# Patient Record
Sex: MALE | Race: OTHER | HISPANIC OR LATINO
[De-identification: names, ages, dates, MRNs, and addresses within clinical notes are randomized per-mention and may not be internally consistent; named-entity substitution may affect disease eponyms.]

---

## 2017-02-20 ENCOUNTER — TRANSCRIPTION ENCOUNTER (OUTPATIENT)
Age: 32
End: 2017-02-20

## 2017-05-19 ENCOUNTER — EMERGENCY (EMERGENCY)
Facility: HOSPITAL | Age: 32
LOS: 1 days | Discharge: HOME | End: 2017-05-19
Attending: EMERGENCY MEDICINE | Admitting: EMERGENCY MEDICINE
Payer: MEDICAID

## 2017-05-19 VITALS
TEMPERATURE: 99 F | HEART RATE: 67 BPM | RESPIRATION RATE: 16 BRPM | SYSTOLIC BLOOD PRESSURE: 108 MMHG | OXYGEN SATURATION: 99 % | DIASTOLIC BLOOD PRESSURE: 69 MMHG

## 2017-05-19 DIAGNOSIS — T18.9XXA FOREIGN BODY OF ALIMENTARY TRACT, PART UNSPECIFIED, INITIAL ENCOUNTER: ICD-10-CM

## 2017-05-19 DIAGNOSIS — Z79.899 OTHER LONG TERM (CURRENT) DRUG THERAPY: ICD-10-CM

## 2017-05-19 DIAGNOSIS — F84.0 AUTISTIC DISORDER: ICD-10-CM

## 2017-05-19 PROCEDURE — 74000: CPT

## 2017-05-19 PROCEDURE — 74000: CPT | Mod: 26

## 2017-05-19 PROCEDURE — 99284 EMERGENCY DEPT VISIT MOD MDM: CPT

## 2017-05-19 PROCEDURE — 71045 X-RAY EXAM CHEST 1 VIEW: CPT

## 2017-05-19 PROCEDURE — 71010: CPT | Mod: 26

## 2017-05-19 RX ORDER — TOPIRAMATE 25 MG
0 TABLET ORAL
Qty: 0 | Refills: 0 | COMMUNITY

## 2017-05-19 RX ORDER — OLANZAPINE 15 MG/1
0 TABLET, FILM COATED ORAL
Qty: 0 | Refills: 0 | COMMUNITY

## 2017-05-19 RX ORDER — CALCIUM CARBONATE 500(1250)
0 TABLET ORAL
Qty: 0 | Refills: 0 | COMMUNITY

## 2017-05-19 RX ORDER — LAMOTRIGINE 25 MG/1
0 TABLET, ORALLY DISINTEGRATING ORAL
Qty: 0 | Refills: 0 | COMMUNITY

## 2017-05-19 NOTE — ED PROVIDER NOTE - PHYSICAL EXAMINATION
Gen: NAD  Head: NCAT  Lung: CTAB, no respiratory distress, no wheezing, rales, rhonchi  CV: normal s1/s2, rrr, no murmurs, Normal perfusion, pulses 2+ throughout  Abd: soft, NTND  MSK: No edema, no visible deformities, full range of motion in all 4 extremities  Skin: No rash

## 2017-05-19 NOTE — ED PROVIDER NOTE - MEDICAL DECISION MAKING DETAILS
32yo male PMh seizure disorder, autism presenting with possible foreign body ingestion, patient asymptomatic at this time. Will obtain xray abdomen and chest xray to evaluate, likely DC home. Kaila Roberts DO

## 2017-05-19 NOTE — ED PROVIDER NOTE - ATTENDING CONTRIBUTION TO CARE
30yo male PMh seizure disorder, autism presenting with suspicion of foreign body ingestion, asymptomatic, abd soft, nt, tolerating po. plain films ordered. no one witnessed him eating a plastic piece of his lunch box but it wasn't found.

## 2017-05-19 NOTE — ED ADULT NURSE NOTE - OBJECTIVE STATEMENT
pt ambulatory to [facility] accompanied by aide/ pt sent from [facility]. [pt with h/o autism lives in a group home  asper aise pt ate a piece of plastic from inside his lunch box. pt with h/o [place] syndrome. pt denies annette c/o abd soft nt pt requesting "cookies"

## 2018-10-22 ENCOUNTER — EMERGENCY (EMERGENCY)
Facility: HOSPITAL | Age: 33
LOS: 1 days | Discharge: HOME | End: 2018-10-22
Attending: EMERGENCY MEDICINE | Admitting: EMERGENCY MEDICINE
Payer: MEDICAID

## 2018-10-22 VITALS
HEART RATE: 68 BPM | TEMPERATURE: 98 F | DIASTOLIC BLOOD PRESSURE: 61 MMHG | RESPIRATION RATE: 18 BRPM | SYSTOLIC BLOOD PRESSURE: 113 MMHG | OXYGEN SATURATION: 100 %

## 2018-10-22 PROBLEM — G40.909 EPILEPSY, UNSPECIFIED, NOT INTRACTABLE, WITHOUT STATUS EPILEPTICUS: Chronic | Status: ACTIVE | Noted: 2017-05-19

## 2018-10-22 PROBLEM — F84.0 AUTISTIC DISORDER: Chronic | Status: ACTIVE | Noted: 2017-05-19

## 2018-10-22 PROCEDURE — 99283 EMERGENCY DEPT VISIT LOW MDM: CPT

## 2018-10-22 PROCEDURE — 71046 X-RAY EXAM CHEST 2 VIEWS: CPT | Mod: 26

## 2018-10-22 PROCEDURE — 74019 RADEX ABDOMEN 2 VIEWS: CPT | Mod: 26

## 2018-10-22 NOTE — ED PROVIDER NOTE - MEDICAL DECISION MAKING DETAILS
33M with autism and pica c/o cotton fabric ingestion 3 hrs ago. no vomiting or nausea, no shortness of breath or visible pain, witnessed by aid , will get xray concerning for other ingestion, will d/c and follow-up if neg imaging.

## 2018-10-22 NOTE — ED PROVIDER NOTE - PLAN OF CARE
Thank you for visiting our Emergency Department, it has been a pleasure taking part in your healthcare.    Your discharge diagnosis is: Foreign body ingestion    A copy of resulted labs, imaging, and findings have been provided to you.   You have had a detailed discussion with your provider regarding your diagnosis, management and discharge plan including, but not limited to: return precautions, follow up visits with existing or new providers, new prescriptions and/or medication changes, wound and/or spint/cast care or other care   aspects specific to your diagnosis and treatment. You have been given the opportunity to have your questions answered. At this time you have been deemed stable and fit for discharge.    Return precautions to the Emergency Department include but are not limited to: unrelenting nausea, vomiting, fever, chills, chest pain, shortness of breath, dizziness, chest or abdominal pain, worsening back pain, syncope, blood in urine or stool, headache that doesn't resolve, numbness or tingling, loss of sensation, loss of motor function, or any other concerning symptoms.

## 2018-10-22 NOTE — ED PROVIDER NOTE - OBJECTIVE STATEMENT
33M pmh intellectual dis, pica, c/o foreign ingestion of about 5x6cm pieces of cotton fabric around 3 hrs ago. Pt denied any abd pain and ate some peanuts afterward with out difficult or vomiting. Ingestion is witnessed by the aid and denied any other ingestion. normal bowel movement

## 2018-10-22 NOTE — ED PROVIDER NOTE - CARE PLAN
Principal Discharge DX:	Foreign body ingestion, initial encounter  Assessment and plan of treatment:	Thank you for visiting our Emergency Department, it has been a pleasure taking part in your healthcare.    Your discharge diagnosis is: Foreign body ingestion    A copy of resulted labs, imaging, and findings have been provided to you.   You have had a detailed discussion with your provider regarding your diagnosis, management and discharge plan including, but not limited to: return precautions, follow up visits with existing or new providers, new prescriptions and/or medication changes, wound and/or spint/cast care or other care   aspects specific to your diagnosis and treatment. You have been given the opportunity to have your questions answered. At this time you have been deemed stable and fit for discharge.    Return precautions to the Emergency Department include but are not limited to: unrelenting nausea, vomiting, fever, chills, chest pain, shortness of breath, dizziness, chest or abdominal pain, worsening back pain, syncope, blood in urine or stool, headache that doesn't resolve, numbness or tingling, loss of sensation, loss of motor function, or any other concerning symptoms.

## 2018-10-22 NOTE — ED ADULT TRIAGE NOTE - CHIEF COMPLAINT QUOTE
pt sent to ED from group home for eating a large piece of fabric.  pt appears in NAD.  as per aide, pt is at mental baseline, calm and cooperative at this time

## 2018-10-22 NOTE — ED PROVIDER NOTE - ATTENDING CONTRIBUTION TO CARE
Patient is a 34 yo M with history of autism, seizure disorder here for evaluation after eating a piece of a t-shirt at 10 AM. No nausea, no vomiting.     VS noted  Gen. no acute distress, Non toxic   HEENT: EOMI, mmm,   Lungs: CTAB/L no C/ W /R   CVS: RRR   Abd; Soft non tender, non distended   Ext: no edema  Skin: no rash  Neuro AAOx3 non focal clear speech  a/p:   - Kelsey ESCOBEDO Patient is a 32 yo M with history of autism, seizure disorder here for evaluation after eating a piece of a t-shirt at 10 AM. No nausea, no vomiting.     VS noted  Gen. no acute distress, Non toxic   HEENT: EOMI, mmm, PERRL  Lungs: CTAB/L no C/ W /R   CVS: RRR   Abd; Soft non tender, non distended   Ext: no edema  Skin: no rash  Neuro AAOx3 non focal clear speech  a/p: s/p ingestion of piece of cotton. No other ingestions per HHA. Pt has been eating and drinking since then. No shortness of breath, no abdominal pain. Explained expectant management.   - Kelsey ESCOBEDO Patient is a 34 yo M with history of autism, seizure disorder here for evaluation after eating a piece of a t-shirt at 10 AM. No nausea, no vomiting.     VS noted  Gen. no acute distress, Non toxic   HEENT: EOMI, mmm, PERRL  Lungs: CTAB/L no C/ W /R   CVS: RRR   Abd; Soft non tender, non distended   Ext: no edema  Skin: no rash  Neuro AAOx3 non focal clear speech  a/p: s/p ingestion of piece of cotton. No other ingestions per HHA. Pt has been eating and drinking since then. No shortness of breath, no abdominal pain. Explained expectant management. Patient stable for discharge.   - Kelsey ESCOBEDO

## 2018-10-29 ENCOUNTER — EMERGENCY (EMERGENCY)
Facility: HOSPITAL | Age: 33
LOS: 1 days | Discharge: HOME | End: 2018-10-29
Attending: EMERGENCY MEDICINE | Admitting: EMERGENCY MEDICINE
Payer: MEDICAID

## 2018-10-29 VITALS
TEMPERATURE: 98 F | RESPIRATION RATE: 17 BRPM | OXYGEN SATURATION: 100 % | DIASTOLIC BLOOD PRESSURE: 62 MMHG | HEART RATE: 70 BPM | SYSTOLIC BLOOD PRESSURE: 121 MMHG

## 2018-10-29 PROCEDURE — 74018 RADEX ABDOMEN 1 VIEW: CPT | Mod: 26

## 2018-10-29 PROCEDURE — 99283 EMERGENCY DEPT VISIT LOW MDM: CPT

## 2018-10-29 PROCEDURE — 71045 X-RAY EXAM CHEST 1 VIEW: CPT | Mod: 26

## 2018-10-29 NOTE — ED ADULT TRIAGE NOTE - CHIEF COMPLAINT QUOTE
pt arriving from [facility], brought in by aide for possible ingestions of button from shirt. pt is nonverbal. pt arriving from [facility], brought in by aide for possible ingestions of button from shirt. pt is nonverbal. pt calm in triage, no guarding noted.

## 2018-10-29 NOTE — ED PROVIDER NOTE - MEDICAL DECISION MAKING DETAILS
33y M with foreign body ingestion this morning. No airway compromise or abd findings concerning obstruction of airway or GI tract. Aid with pt and did not witness event so will get XR to evaluate. If nml and pt tolerates PO will dc home

## 2018-10-29 NOTE — ED PROVIDER NOTE - OBJECTIVE STATEMENT
33y M PMHx nonverbal from [facility] here for foreign ingestion today. Pt brought in to ED by aid after swallowed shirt button. Has hx of foreign ingestion. Acting baseline since 33y M PMHx nonverbal from [facility] here for foreign ingestion today. Pt brought in to ED by aide after swallowed shirt button. Has hx of foreign ingestion, seen last week for same. Acting baseline since as per aide.  Pt without airway compromise, N/V/D or obvious abdominal pain.  Pt is hungry as per aide.

## 2018-10-30 ENCOUNTER — EMERGENCY (EMERGENCY)
Facility: HOSPITAL | Age: 33
LOS: 1 days | Discharge: HOME | End: 2018-10-30
Attending: EMERGENCY MEDICINE | Admitting: EMERGENCY MEDICINE
Payer: MEDICAID

## 2018-10-30 VITALS
HEART RATE: 75 BPM | TEMPERATURE: 98 F | RESPIRATION RATE: 18 BRPM | SYSTOLIC BLOOD PRESSURE: 105 MMHG | OXYGEN SATURATION: 98 % | DIASTOLIC BLOOD PRESSURE: 69 MMHG

## 2018-10-30 VITALS
RESPIRATION RATE: 16 BRPM | HEART RATE: 63 BPM | SYSTOLIC BLOOD PRESSURE: 120 MMHG | TEMPERATURE: 96 F | OXYGEN SATURATION: 100 % | DIASTOLIC BLOOD PRESSURE: 58 MMHG

## 2018-10-30 PROCEDURE — 99283 EMERGENCY DEPT VISIT LOW MDM: CPT

## 2018-10-30 PROCEDURE — 71045 X-RAY EXAM CHEST 1 VIEW: CPT | Mod: 26

## 2018-10-30 PROCEDURE — 74018 RADEX ABDOMEN 1 VIEW: CPT | Mod: 26

## 2018-10-30 NOTE — ED ADULT NURSE NOTE - NSIMPLEMENTINTERV_GEN_ALL_ED
Implemented All Universal Safety Interventions:  [place] to call system. Call bell, personal items and telephone within reach. Instruct patient to call for assistance. Room bathroom lighting operational. Non-slip footwear when patient is off stretcher. Physically safe environment: no spills, clutter or unnecessary equipment. Stretcher in lowest position, wheels locked, appropriate side rails in place.

## 2018-10-30 NOTE — ED PROVIDER NOTE - OBJECTIVE STATEMENT
China Suarez MD PGY-1 Pt is a 33 t/o M hx autism d/o (nonverbal at baseline) and seizure disorder, presents from school after ingestion of paper clothes tag at 930 AM. Presnts with  who states that this is a recurrent episode, last happened yesterday. Did not exhibit abnormal behavior after ate the tag, but states that he has a history of Pica. No SOB, chest or abdominal pain. No difficulty breathing.

## 2018-10-30 NOTE — ED PROVIDER NOTE - MEDICAL DECISION MAKING DETAILS
China Suarez MD PGY-1 Pt is a 33 t/o M hx autism d/o (nonverbal at baseline) and seizure disorder, presents from school after ingestion of paper clothes tag at 930 AM. No respiratory distress. Xray chest/abdomen to r/o foreign body obstruction

## 2018-10-30 NOTE — ED PROVIDER NOTE - ATTENDING CONTRIBUTION TO CARE
ben: lamonte from facility because he allegedly swallowed a small cloth tag (from a jacket) today.  no new sx.   seen yesterday for button ingestion.  exam: NAD. abd soft and non-tender.   will obtain cxr and abd flat plate.

## 2018-10-30 NOTE — ED ADULT NURSE NOTE - CHIEF COMPLAINT QUOTE
p/t with hx MR  sent from day care for eval, s/p "close tag" ingestion this am, no signs of any airway issues noted

## 2018-10-30 NOTE — ED PROVIDER NOTE - CHIEF COMPLAINT
The patient is a 33y Male complaining of The patient is a 33y Male complaining of foreign body ingestion

## 2018-10-30 NOTE — ED ADULT NURSE NOTE - OBJECTIVE STATEMENT
Received pt into [facility], accompanied by school staff. Pt is A/O x 1, nonverbal , communicates via sign language. School staff able to communicate with pt via sign language. Pt does not appear in any acute distress. Pt smiling, communicating with school staff member via sign language to ask for food. Instructed staff pt to be NPO until eval by MD. No resp distress, no coughing, drooling or clearing of throat noted while in room. Vitals stable. Pt calm cooperative. Awaiting eval by MD

## 2020-12-23 NOTE — ED PROVIDER NOTE - NS ED MD DISPO DISCHARGE CCDA
Notify the patient PSA for prostate cancer normal blood sugar kidney liver test electrolytes cholesterol thyroid all normal Patient/Caregiver provided printed discharge information.

## 2023-09-25 ENCOUNTER — APPOINTMENT (OUTPATIENT)
Dept: OTOLARYNGOLOGY | Facility: CLINIC | Age: 38
End: 2023-09-25
Payer: MEDICAID

## 2023-09-25 ENCOUNTER — OUTPATIENT (OUTPATIENT)
Dept: OUTPATIENT SERVICES | Facility: HOSPITAL | Age: 38
LOS: 1 days | Discharge: HOME | End: 2023-09-25

## 2023-09-25 VITALS
WEIGHT: 190 LBS | HEART RATE: 59 BPM | SYSTOLIC BLOOD PRESSURE: 102 MMHG | BODY MASS INDEX: 29.82 KG/M2 | DIASTOLIC BLOOD PRESSURE: 69 MMHG | HEIGHT: 67 IN

## 2023-09-25 DIAGNOSIS — H93.293 OTHER ABNORMAL AUDITORY PERCEPTIONS, BILATERAL: ICD-10-CM

## 2023-09-25 DIAGNOSIS — Z86.59 PERSONAL HISTORY OF OTHER MENTAL AND BEHAVIORAL DISORDERS: ICD-10-CM

## 2023-09-25 DIAGNOSIS — H61.23 IMPACTED CERUMEN, BILATERAL: ICD-10-CM

## 2023-09-25 PROCEDURE — 92567 TYMPANOMETRY: CPT

## 2023-09-25 PROCEDURE — 99203 OFFICE O/P NEW LOW 30 MIN: CPT | Mod: 25

## 2023-09-25 PROCEDURE — 92557 COMPREHENSIVE HEARING TEST: CPT

## 2023-09-25 PROCEDURE — G0268 REMOVAL OF IMPACTED WAX MD: CPT

## 2023-09-25 RX ORDER — LEVETIRACETAM 1000 MG/1
TABLET, FILM COATED ORAL
Refills: 0 | Status: ACTIVE | COMMUNITY

## 2023-09-25 RX ORDER — MIDAZOLAM 5 MG/.1ML
5 SPRAY NASAL
Refills: 0 | Status: ACTIVE | COMMUNITY

## 2023-09-25 RX ORDER — LURASIDONE HYDROCHLORIDE 60 MG/1
TABLET, FILM COATED ORAL
Refills: 0 | Status: ACTIVE | COMMUNITY

## 2023-09-25 RX ORDER — LAMOTRIGINE 150 MG/1
TABLET ORAL
Refills: 0 | Status: ACTIVE | COMMUNITY

## 2023-09-25 RX ORDER — TOPIRAMATE 50 MG/1
TABLET, FILM COATED ORAL
Refills: 0 | Status: ACTIVE | COMMUNITY

## 2023-09-25 RX ORDER — CHOLECALCIFEROL (VITAMIN D3) 25 MCG
TABLET ORAL
Refills: 0 | Status: ACTIVE | COMMUNITY

## 2023-09-27 DIAGNOSIS — H93.293 OTHER ABNORMAL AUDITORY PERCEPTIONS, BILATERAL: ICD-10-CM

## 2023-09-27 DIAGNOSIS — H61.23 IMPACTED CERUMEN, BILATERAL: ICD-10-CM

## 2024-09-09 ENCOUNTER — APPOINTMENT (OUTPATIENT)
Dept: OTOLARYNGOLOGY | Facility: CLINIC | Age: 39
End: 2024-09-09
Payer: MEDICAID

## 2024-09-09 ENCOUNTER — OUTPATIENT (OUTPATIENT)
Dept: OUTPATIENT SERVICES | Facility: HOSPITAL | Age: 39
LOS: 1 days | Discharge: HOME | End: 2024-09-09

## 2024-09-09 VITALS
WEIGHT: 201 LBS | HEIGHT: 66 IN | HEART RATE: 54 BPM | DIASTOLIC BLOOD PRESSURE: 65 MMHG | SYSTOLIC BLOOD PRESSURE: 110 MMHG | BODY MASS INDEX: 32.3 KG/M2

## 2024-09-09 DIAGNOSIS — H93.293 OTHER ABNORMAL AUDITORY PERCEPTIONS, BILATERAL: ICD-10-CM

## 2024-09-09 DIAGNOSIS — F81.89 OTHER DEVELOPMENTAL DISORDERS OF SCHOLASTIC SKILLS: ICD-10-CM

## 2024-09-09 DIAGNOSIS — H61.23 IMPACTED CERUMEN, BILATERAL: ICD-10-CM

## 2024-09-09 PROCEDURE — 69210 REMOVE IMPACTED EAR WAX UNI: CPT | Mod: 50

## 2024-09-09 RX ORDER — LURASIDONE HYDROCHLORIDE 80 MG/1
80 TABLET, FILM COATED ORAL
Qty: 30 | Refills: 0 | Status: ACTIVE | COMMUNITY
Start: 2024-06-20

## 2024-09-09 RX ORDER — IBUPROFEN 600 MG/1
600 TABLET, FILM COATED ORAL
Qty: 28 | Refills: 0 | Status: ACTIVE | COMMUNITY
Start: 2024-05-08

## 2024-09-09 RX ORDER — LAMOTRIGINE 250 MG/1
250 TABLET, EXTENDED RELEASE ORAL
Qty: 60 | Refills: 0 | Status: ACTIVE | COMMUNITY
Start: 2024-05-16

## 2024-09-09 RX ORDER — CALCIUM CARBONATE/VITAMIN D3 500MG-5MCG
500-5 TABLET ORAL
Qty: 60 | Refills: 0 | Status: ACTIVE | COMMUNITY
Start: 2024-05-16

## 2024-09-09 RX ORDER — HYDROPHOR 42 G/100G
OINTMENT TOPICAL
Qty: 454 | Refills: 0 | Status: ACTIVE | COMMUNITY
Start: 2023-10-11

## 2024-09-09 NOTE — HISTORY OF PRESENT ILLNESS
[de-identified] : 38 year old with hx of bilateral cerumen impaction for yearly follow-up. Patient is Autistic and non-verbal, accompanied by [facility] staff members Penelope and Hudson.  Denies pulling/tugging, otorrhea. recent fevers or ear infections.  Denies nasal congestion, dysphagia, coughing or choking.

## 2024-09-09 NOTE — ASSESSMENT
[FreeTextEntry1] : 38 year nonverbal M with bilateral cerumen impaction. Patient tolerated cerumen removal without complaints.   Personally reviewed audiogram shows Patient could not comply with pure tone however patient consistently identified body parts and followed verbal commands at 30dB bilaterally hearing is adequate for daily function. AD Tymp A. AS CNT  Physical exam shows bilateral  ears normal EAC/TM.   Recommend: Cerumen Impaction -Discussed not using q-tips or instruments to remove wax Discussed that the ear is a self cleaning structure and just allow it clean itself. If wax builds up can try debrox. Once it gets impacted recommend return to get it cleaned out.  -Return to clinic as needed or sooner if new/worsen symptoms present

## 2024-09-09 NOTE — REASON FOR VISIT
[Subsequent Evaluation] : a subsequent evaluation for [Other: _____] : [unfilled] [FreeTextEntry2] : cerumen impaction

## 2024-09-25 DIAGNOSIS — F81.89 OTHER DEVELOPMENTAL DISORDERS OF SCHOLASTIC SKILLS: ICD-10-CM

## 2024-09-25 DIAGNOSIS — H61.23 IMPACTED CERUMEN, BILATERAL: ICD-10-CM

## 2024-09-25 DIAGNOSIS — H93.293 OTHER ABNORMAL AUDITORY PERCEPTIONS, BILATERAL: ICD-10-CM

## 2025-09-08 ENCOUNTER — APPOINTMENT (OUTPATIENT)
Dept: OTOLARYNGOLOGY | Facility: CLINIC | Age: 40
End: 2025-09-08
Payer: MEDICAID

## 2025-09-08 VITALS
DIASTOLIC BLOOD PRESSURE: 79 MMHG | HEIGHT: 66 IN | WEIGHT: 200 LBS | BODY MASS INDEX: 32.14 KG/M2 | SYSTOLIC BLOOD PRESSURE: 121 MMHG

## 2025-09-08 DIAGNOSIS — H61.23 IMPACTED CERUMEN, BILATERAL: ICD-10-CM

## 2025-09-08 DIAGNOSIS — H93.293 OTHER ABNORMAL AUDITORY PERCEPTIONS, BILATERAL: ICD-10-CM

## 2025-09-08 PROCEDURE — 69210 REMOVE IMPACTED EAR WAX UNI: CPT
